# Patient Record
Sex: MALE | Race: BLACK OR AFRICAN AMERICAN | NOT HISPANIC OR LATINO | Employment: FULL TIME | URBAN - METROPOLITAN AREA
[De-identification: names, ages, dates, MRNs, and addresses within clinical notes are randomized per-mention and may not be internally consistent; named-entity substitution may affect disease eponyms.]

---

## 2020-10-02 DIAGNOSIS — Z31.41 ENCOUNTER FOR SPERM COUNT FOR FERTILITY TESTING: Primary | ICD-10-CM

## 2020-10-07 DIAGNOSIS — Z31.41 ENCOUNTER FOR SPERM COUNT FOR FERTILITY TESTING: ICD-10-CM

## 2020-10-07 LAB
ABNORMAL SPERM: 100 MORPHOLOGY
ABSTINENCE DAYS: 3 DAYS (ref 2–7)
AGGLUTINATION: NO YES/NO
ANALYSIS TEMP - CENTIGRADE: 24 CENTIGRADE
CELL FRAGMENTS: ABNORMAL %
COLLECTION METHOD: ABNORMAL
COLLECTION SITE: ABNORMAL
CONSENT TO RELEASE TO PARTNER: YES
HEAD DEFECT: 100
IMMATURE SPERM: ABNORMAL %
IMMOTILE: 65 %
LAB RECEIPT TIME: ABNORMAL
LIQUEFIED: YES YES/NO
MIDPIECE DEFECT: 54
NON-PROGRESSIVE MOTILITY: 15 %
NORMAL SPERM: 0 % NORMAL FORMS (ref 4–?)
PROGRESSIVE MOTILITY: 20 % (ref 32–?)
ROUND CELLS: 0 MILLION/ML (ref ?–2)
SPECIMEN CONCENTRATION: 0.8 MILLION/ML (ref 15–?)
SPECIMEN PH: 7.2 PH (ref 7.2–?)
SPECIMEN TYPE: ABNORMAL
SPECIMEN VOL UR: 1.5 ML (ref 1.5–?)
TAIL DEFECT: 24
TIME OF ANALYSIS: ABNORMAL
TOTAL NUMBER: 1 MILLION (ref 39–?)
TOTAL PROGRESSIVE MOTILE: 0.2 MILLION (ref 15.6–?)
VISCOUS: NO YES/NO
VITALITY: ABNORMAL % (ref 58–?)
WBC SPECIMEN: ABNORMAL %

## 2020-10-07 PROCEDURE — 89322 SEMEN ANAL STRICT CRITERIA: CPT

## 2020-11-17 DIAGNOSIS — Z31.41 ENCOUNTER FOR SPERM COUNT FOR FERTILITY TESTING: Primary | ICD-10-CM

## 2020-11-20 ENCOUNTER — TRANSFERRED RECORDS (OUTPATIENT)
Dept: HEALTH INFORMATION MANAGEMENT | Facility: CLINIC | Age: 35
End: 2020-11-20

## 2020-11-23 DIAGNOSIS — Z31.41 ENCOUNTER FOR SPERM COUNT FOR FERTILITY TESTING: ICD-10-CM

## 2020-11-23 PROCEDURE — 89322 SEMEN ANAL STRICT CRITERIA: CPT

## 2020-11-24 LAB
ABNORMAL SPERM: 99 MORPHOLOGY
ABSTINENCE DAYS: 4 DAYS (ref 2–7)
AGGLUTINATION: NO YES/NO
ANALYSIS TEMP - CENTIGRADE: 23 CENTIGRADE
CELL FRAGMENTS: ABNORMAL %
COLLECTION METHOD: ABNORMAL
COLLECTION SITE: ABNORMAL
CONSENT TO RELEASE TO PARTNER: YES
HEAD DEFECT: 100
IMMATURE SPERM: ABNORMAL %
IMMOTILE: 75 %
LAB RECEIPT TIME: ABNORMAL
LIQUEFIED: YES YES/NO
MIDPIECE DEFECT: 55
NON-PROGRESSIVE MOTILITY: 9 %
NORMAL SPERM: 1 % NORMAL FORMS (ref 4–?)
PROGRESSIVE MOTILITY: 16 % (ref 32–?)
ROUND CELLS: 0 MILLION/ML (ref ?–2)
SPECIMEN CONCENTRATION: 0.8 MILLION/ML (ref 15–?)
SPECIMEN PH: 7.6 PH (ref 7.2–?)
SPECIMEN TYPE: ABNORMAL
SPECIMEN VOL UR: 4.5 ML (ref 1.5–?)
TAIL DEFECT: 21
TIME OF ANALYSIS: ABNORMAL
TOTAL NUMBER: 4 MILLION (ref 39–?)
TOTAL PROGRESSIVE MOTILE: 0.6 MILLION (ref 15.6–?)
VISCOUS: NO YES/NO
VITALITY: ABNORMAL % (ref 58–?)
WBC SPECIMEN: ABNORMAL %

## 2021-05-11 DIAGNOSIS — Z31.41 ENCOUNTER FOR SPERM COUNT FOR FERTILITY TESTING: Primary | ICD-10-CM

## 2021-06-02 DIAGNOSIS — Z31.41 ENCOUNTER FOR SPERM COUNT FOR FERTILITY TESTING: ICD-10-CM

## 2021-06-02 LAB
ABNORMAL SPERM: 98 MORPHOLOGY
ABSTINENCE DAYS: 3 DAYS (ref 2–7)
AGGLUTINATION: NO YES/NO
ANALYSIS TEMP - CENTIGRADE: 24 CENTIGRADE
CELL FRAGMENTS: ABNORMAL %
COLLECTION METHOD: ABNORMAL
COLLECTION SITE: ABNORMAL
CONSENT TO RELEASE TO PARTNER: YES
HEAD DEFECT: 98
IMMATURE SPERM: ABNORMAL %
IMMOTILE: 47 %
LAB RECEIPT TIME: ABNORMAL
LIQUEFIED: YES YES/NO
MIDPIECE DEFECT: 35
NON-PROGRESSIVE MOTILITY: 11 %
NORMAL SPERM: 2 % NORMAL FORMS (ref 4–?)
PROGRESSIVE MOTILITY: 42 % (ref 32–?)
ROUND CELLS: 0 MILLION/ML (ref ?–2)
SPECIMEN CONCENTRATION: 2 MILLION/ML (ref 15–?)
SPECIMEN PH: 7.6 PH (ref 7.2–?)
SPECIMEN TYPE: ABNORMAL
SPECIMEN VOL UR: 3.3 ML (ref 1.5–?)
TAIL DEFECT: 7
TIME OF ANALYSIS: ABNORMAL
TOTAL NUMBER: 7 MILLION (ref 39–?)
TOTAL PROGRESSIVE MOTILE: 3 MILLION (ref 15.6–?)
VISCOUS: NO YES/NO
VITALITY: ABNORMAL % (ref 58–?)
WBC SPECIMEN: ABNORMAL %

## 2021-06-02 PROCEDURE — 89322 SEMEN ANAL STRICT CRITERIA: CPT

## 2021-07-13 ENCOUNTER — MEDICAL CORRESPONDENCE (OUTPATIENT)
Dept: HEALTH INFORMATION MANAGEMENT | Facility: CLINIC | Age: 36
End: 2021-07-13

## 2021-07-14 ENCOUNTER — MEDICAL CORRESPONDENCE (OUTPATIENT)
Dept: HEALTH INFORMATION MANAGEMENT | Facility: CLINIC | Age: 36
End: 2021-07-14

## 2021-07-23 ENCOUNTER — PRE VISIT (OUTPATIENT)
Dept: UROLOGY | Facility: CLINIC | Age: 36
End: 2021-07-23

## 2021-07-23 ENCOUNTER — TELEPHONE (OUTPATIENT)
Dept: UROLOGY | Facility: CLINIC | Age: 36
End: 2021-07-23

## 2021-07-23 NOTE — TELEPHONE ENCOUNTER
[facility] Call Center    Phone Message    May a detailed message be left on voicemail: yes     Reason for Call: Appointment Intake    Referring Provider Name: [facility] for reproductive health   Diagnosis and/or Symptoms: infertility and testicular pain    pts spouse is calling, pt is being referred for infertility and testicular pain, our protocol states to only schedule video visit, and they declined and want in person only with , please call Tiffanie, thank you    Action Taken: Message routed to:  [facility] ([facility]): uro    Travel Screening: Not Applicable

## 2021-07-23 NOTE — TELEPHONE ENCOUNTER
Reason for visit: Consult     Relevant information: infertility and testicular pain    Records/imaging/labs/orders: in Epic; SA completed    Pt called: no    At Rooming: normal

## 2021-07-30 ENCOUNTER — OFFICE VISIT (OUTPATIENT)
Dept: UROLOGY | Facility: CLINIC | Age: 36
End: 2021-07-30
Payer: COMMERCIAL

## 2021-07-30 VITALS
DIASTOLIC BLOOD PRESSURE: 76 MMHG | HEIGHT: 70 IN | HEART RATE: 65 BPM | BODY MASS INDEX: 29.2 KG/M2 | SYSTOLIC BLOOD PRESSURE: 117 MMHG | WEIGHT: 204 LBS

## 2021-07-30 DIAGNOSIS — N50.811 PAIN IN RIGHT TESTICLE: Primary | ICD-10-CM

## 2021-07-30 DIAGNOSIS — I86.1 VARICOCELE: ICD-10-CM

## 2021-07-30 DIAGNOSIS — N43.40 SPERMATOCELE: ICD-10-CM

## 2021-07-30 DIAGNOSIS — N46.11 OLIGOASTHENOTERATOSPERMIA: ICD-10-CM

## 2021-07-30 PROCEDURE — 99204 OFFICE O/P NEW MOD 45 MIN: CPT | Performed by: UROLOGY

## 2021-07-30 RX ORDER — HYDROCODONE BITARTRATE AND ACETAMINOPHEN 5; 325 MG/1; MG/1
1-2 TABLET ORAL
COMMUNITY
Start: 2021-02-16

## 2021-07-30 RX ORDER — GABAPENTIN 300 MG/1
CAPSULE ORAL
COMMUNITY
Start: 2021-06-10

## 2021-07-30 ASSESSMENT — MIFFLIN-ST. JEOR: SCORE: 1856.59

## 2021-07-30 ASSESSMENT — PAIN SCALES - GENERAL: PAINLEVEL: SEVERE PAIN (7)

## 2021-07-30 NOTE — LETTER
7/30/2021       RE: Jordyn Hill  [address] [facility] [address]     Dear Colleague,    Thank you for referring your patient, Jordyn Hill, to the [facility] [place] at [facility]. Please see a copy of my visit note below.    It was my pleasure to see Mr. Jordyn Hill, a 36 year old male here in consultation today for fertility evaluation.  His spouse is Tiffanie age 41 (born March 1983).    This couple has been attempting to conceive for the last year. They have no previous pregnancy together.  Pregnancies with other partners: she has two, he has never fathered a child..  They have tried timed intercourse.     Dr. Camacho did a bilateral varicocele repair for him Feb 2021.  He had lingering right testis pain, post-op scrotal ultrasound was not revealing, it sounds like.  Varicocele repair was done for fertility, and he although he has seen improvement in total progressive motile count, he persists with severe oligospermia.    Complains of pain in right epididymis pain since varicocele repair.  Left side feels fine.  Ice helps with right epididymis discomfort, worse with touch, activity, worse after ejaculation.  Postop imaging suggests persisting left varicocele.    Scrotal ultrasound done 5/25/21 showed left varicocele still  IMPRESSION:   1. Mild left varicocele. Right pampiniform veins with Valsalva measure just below the size threshold for varicocele.   2. A 2 mm cyst inferiorly in the right testicle and 2 small left epididymal head cysts are all similar to prior.    Scrotal ultrasound done 10/2020 showed left varicocele  IMPRESSION:   1. No evidence of testicular mass or torsion.   2. Borderline sized varicoceles bilaterally.       Female factors suspected: advanced age.    PAST MEDICAL HISTORY:    No past medical history on file.   Puberty normal   No associated conditions such as ED or sexual dysfunction.  No   "problems.     PAST SURG HISTORY  Bilateral varicocele repair 2/2021    Medications as of 7/30/2021:  Current Outpatient Medications   Medication Sig     gabapentin (NEURONTIN) 300 MG capsule      HYDROcodone-acetaminophen (NORCO) 5-325 MG tablet Take 1-2 tablets by mouth     vitamin C (ASCORBIC ACID) 250 MG TABS tablet      vitamin E (TOCOPHEROL) 100 units (45 mg) capsule      No current facility-administered medications for this visit.        ALLERGY:   No Known Allergies    SOCIAL HISTORY:   . Occupation: HVAC.     No alcohol or tobacco.      GENERAL PHYSICAL EXAM  /76   Pulse 65   Ht 1.778 m (5' 10\")   Wt 92.5 kg (204 lb)   BMI 29.27 kg/m     Constitutional: No acute distress. Well nourished.   PSYCH: normal mood and affect.  NEURO: normal gait, no focal deficits.   EYES: anicteric, EOMI, PERR  CARDIOPULMONARY: breathing non-labored, pulse regular, no peripheral edema.  GI: Abdomen soft, non-tender, nondistended   MUSCULOSKELETAL: normal limb proportions, no muscle wasting, no contractures.  SKIN: Normal virilized hair distribution, no lesions, warts or rashes over genitalia, abdomen extremities or face.  HEME/LYMPH: no ecchymosis, no lymphadenopathy in groin, no lymphedema.     EXAM:  Phallus circumcised, meatus adequate, no plaques palpated.   Left testis descended , size is 12 cc , consistency is slightly soft . No intra-testicular masses.   Right testis descended , size is 10 cc , consistency is slightly soft . No intra-testicular masses.   Epididymes present, left nontender, non-tender, not enlarged.   His discomfort localizes to the head of the right epididymis.  Left cord: Vas present. No clinical varicocele.  Right cord: Vas present. No clinical varicocele.     Rectal exam deferred.     Labs reviewed with him today:  Component      Latest Ref Rng & Units 10/7/2020 11/23/2020 6/2/2021   Collection Method       Masturbation Masturbation Masturbation   Collection Site       [facility]"   Specimen Type       Semen Semen Semen   Lab Receipt Time       06:35 AM 09:50 AM 06:34 AM   Time of Analysis       06:50 AM 10:05 AM 06:49 AM   Analysis Temp - Centigrade      centigrade 24 23 24   Abstinence days      2 - 7 days 3 4 3   Liquefied      yes/no Yes Yes Yes   Viscous      yes/no No No No   Agglutination      yes/no No No No   pH      7.2 pH 7.2 7.6 7.6   Volume      1.5 ml 1.5 4.5 3.3   Concentration      15 million/ml 0.8 (A) 0.8 (A) 2.0 (A)   Total Number      39 million 1.0 (A) 4.0 (A) 7.0 (A)   Progressive motility      32 % 20 (A) 16 (A) 42   Non-progressive motility      % 15 9 11   Immotile      % 65 75 47   Total Progressive Motile      15.6 million 0.2 (A) 0.6 (A) 3.0 (A)   Vitality      58 % QNS QNS ND   Normal Sperm      4 % normal forms 0 (A) 1 (A) 2 (A)   Abnormal Sperm      morphology 100 99 98   Head Defect       100 100 98   Midpiece Defect       54 55 35   Tail Defect       24 21 7   Round Cells      2 million/ml 0 0 0   WBC      % . . .   Immature Sperm      % . . .   Cell Fragments      % . . .   Consent to Release to Partner       Yes Yes Yes     Labs 10/2/20 outside facility, reviewed today:    E2 41  Prolactin 8.7  LH 9 (1-12)  TSH  1.36  PSA 0.3  FSH 8.4 11/20/20     Karyotype and Y-chromosome microdeletion were normal at outside facility recently. Done after varicocele repair.  They do have a female fertility clinic they are working with.    I reviewed Dr. Camacho's [facility] note 2/16/21 and OP note 2/16/21  ASSESSMENT:    Fertility Testing.    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  Chronic benign issue    Right epididymal pain, etiology uncertain.  Likely chronic issue    Left persisting varicocele by US, but not on clinical exam.      Advanced female age    PLAN:      They are seeing Dr. Valenzuela to pursue IVF.  Karyotype and Y-chromosome microdeletion tested by [facility] clinic, I definitely  agree with checking these tests- would prefer to have done this prior to varicocele repair.    Uncertain etiology for chronic right epididymis discomfort.  On exam, his pain clearly localizes to the right epididymal head.  This area appears normal on imaging.  This discomfort could be due to neuropathic causes, or idiopathic epididymalgia.  There is no clinical varicocele on either side today.    Recommended they pursue in vitro fertilization, especially given female age    Recommended observation for epididymal pain, take over-the-counter analgesics as needed.  Surgical options for pain management would be right epididymectomy, or right orchiectomy.  He could also see a pain clinic to discuss nonsurgical options.    PRN follow-up with urology.     Thank-you for allowing me to care for your patient.  Sincerely,    Fausto Card MD      CC: [facility].    Additional Coding Information:    Problems:  4 -- two or more stable chronic illnesses    Data Reviewed  Review of the result(s) of each unique test - Multiple lab tests as listed above, imaging    I reviewed Dr. Camacho's [facility] clinic note 2/16/21 and OP note 2/16/21      Level of risk:  3 -- low risk (e.g., OTC medication or observation, minor surgery without risks)    Time spent:  43 minutes spent on the date of the encounter doing chart review, history and exam, documentation and further activities per the note

## 2021-07-30 NOTE — NURSING NOTE
"Chief Complaint   Patient presents with     Consult     infertility and testicular pain       Height 1.778 m (5' 10\"), weight 92.5 kg (204 lb). Body mass index is 29.27 kg/m .    There is no problem list on file for this patient.      No Known Allergies    Current Outpatient Medications   Medication Sig Dispense Refill     gabapentin (NEURONTIN) 300 MG capsule        HYDROcodone-acetaminophen (NORCO) 5-325 MG tablet Take 1-2 tablets by mouth       vitamin C (ASCORBIC ACID) 250 MG TABS tablet        vitamin E (TOCOPHEROL) 100 units (45 mg) capsule          Social History     Tobacco Use     Smoking status: Never Smoker     Smokeless tobacco: Never Used   Substance Use Topics     Alcohol use: None     Drug use: None       Hussain Mendosa EMT  7/30/2021  8:51 AM  "

## 2021-07-30 NOTE — PATIENT INSTRUCTIONS
Please follow up with Dr. Card as needed.    It was a pleasure meeting with you today.  Thank you for allowing me and my team the privilege of caring for you today.  YOU are the reason we are here, and I truly hope we provided you with the excellent service you deserve.  Please let us know if there is anything else we can do for you so that we can be sure you are leaving completely satisfied with your care experience.

## 2021-07-30 NOTE — PROGRESS NOTES
It was my pleasure to see Mr. Jordyn Hill, a 36 year old male here in consultation today for fertility evaluation.  His spouse is Tiffanie age 41 (born March 1983).    This couple has been attempting to conceive for the last year. They have no previous pregnancy together.  Pregnancies with other partners: she has two, he has never fathered a child..  They have tried timed intercourse.     Dr. Camacho did a bilateral varicocele repair for him Feb 2021.  He had lingering right testis pain, post-op scrotal ultrasound was not revealing, it sounds like.  Varicocele repair was done for fertility, and he although he has seen improvement in total progressive motile count, he persists with severe oligospermia.    Complains of pain in right epididymis pain since varicocele repair.  Left side feels fine.  Ice helps with right epididymis discomfort, worse with touch, activity, worse after ejaculation.  Postop imaging suggests persisting left varicocele.    Scrotal ultrasound done 5/25/21 showed left varicocele still  IMPRESSION:   1. Mild left varicocele. Right pampiniform veins with Valsalva measure just below the size threshold for varicocele.   2. A 2 mm cyst inferiorly in the right testicle and 2 small left epididymal head cysts are all similar to prior.    Scrotal ultrasound done 10/2020 showed left varicocele  IMPRESSION:   1. No evidence of testicular mass or torsion.   2. Borderline sized varicoceles bilaterally.       Female factors suspected: advanced age.    PAST MEDICAL HISTORY:    No past medical history on file.   Puberty normal   No associated conditions such as ED or sexual dysfunction.  No  problems.     PAST SURG HISTORY  Bilateral varicocele repair 2/2021    Medications as of 7/30/2021:  Current Outpatient Medications   Medication Sig     gabapentin (NEURONTIN) 300 MG capsule      HYDROcodone-acetaminophen (NORCO) 5-325 MG tablet Take 1-2 tablets by mouth     vitamin C (ASCORBIC ACID) 250 MG TABS tablet   "    vitamin E (TOCOPHEROL) 100 units (45 mg) capsule      No current facility-administered medications for this visit.        ALLERGY:   No Known Allergies    SOCIAL HISTORY:   . Occupation: HVAC.     No alcohol or tobacco.      GENERAL PHYSICAL EXAM  /76   Pulse 65   Ht 1.778 m (5' 10\")   Wt 92.5 kg (204 lb)   BMI 29.27 kg/m     Constitutional: No acute distress. Well nourished.   PSYCH: normal mood and affect.  NEURO: normal gait, no focal deficits.   EYES: anicteric, EOMI, PERR  CARDIOPULMONARY: breathing non-labored, pulse regular, no peripheral edema.  GI: Abdomen soft, non-tender, nondistended   MUSCULOSKELETAL: normal limb proportions, no muscle wasting, no contractures.  SKIN: Normal virilized hair distribution, no lesions, warts or rashes over genitalia, abdomen extremities or face.  HEME/LYMPH: no ecchymosis, no lymphadenopathy in groin, no lymphedema.     EXAM:  Phallus circumcised, meatus adequate, no plaques palpated.   Left testis descended , size is 12 cc , consistency is slightly soft . No intra-testicular masses.   Right testis descended , size is 10 cc , consistency is slightly soft . No intra-testicular masses.   Epididymes present, left nontender, non-tender, not enlarged.   His discomfort localizes to the head of the right epididymis.  Left cord: Vas present. No clinical varicocele.  Right cord: Vas present. No clinical varicocele.     Rectal exam deferred.     Labs reviewed with him today:  Component      Latest Ref Rng & Units 10/7/2020 11/23/2020 6/2/2021   Collection Method       Masturbation Masturbation Masturbation   Collection Site       [facility]   Specimen Type       Semen Semen Semen   Lab Receipt Time       06:35 AM 09:50 AM 06:34 AM   Time of Analysis       06:50 AM 10:05 AM 06:49 AM   Analysis Temp - Centigrade      centigrade 24 23 24   Abstinence days      2 - 7 days 3 4 3   Liquefied      yes/no Yes Yes Yes   Viscous      yes/no No No No   Agglutination      " yes/no No No No   pH      7.2 pH 7.2 7.6 7.6   Volume      1.5 ml 1.5 4.5 3.3   Concentration      15 million/ml 0.8 (A) 0.8 (A) 2.0 (A)   Total Number      39 million 1.0 (A) 4.0 (A) 7.0 (A)   Progressive motility      32 % 20 (A) 16 (A) 42   Non-progressive motility      % 15 9 11   Immotile      % 65 75 47   Total Progressive Motile      15.6 million 0.2 (A) 0.6 (A) 3.0 (A)   Vitality      58 % QNS QNS ND   Normal Sperm      4 % normal forms 0 (A) 1 (A) 2 (A)   Abnormal Sperm      morphology 100 99 98   Head Defect       100 100 98   Midpiece Defect       54 55 35   Tail Defect       24 21 7   Round Cells      2 million/ml 0 0 0   WBC      % . . .   Immature Sperm      % . . .   Cell Fragments      % . . .   Consent to Release to Partner       Yes Yes Yes     Labs 10/2/20 outside facility, reviewed today:    E2 41  Prolactin 8.7  LH 9 (1-12)  TSH  1.36  PSA 0.3  FSH 8.4 11/20/20     Karyotype and Y-chromosome microdeletion were normal at outside facility recently. Done after varicocele repair.  They do have a female fertility clinic they are working with.    I reviewed Dr. Camacho[facility] note 2/16/21 and OP note 2/16/21  ASSESSMENT:    Fertility Testing.    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  Chronic benign issue    Right epididymal pain, etiology uncertain.  Likely chronic issue    Left persisting varicocele by US, but not on clinical exam.      Advanced female age    PLAN:      They are seeing Dr. Valenzuela to pursue IVF.  Karyotype and Y-chromosome microdeletion tested by [facility] clinic, I definitely agree with checking these tests- would prefer to have done this prior to varicocele repair.    Uncertain etiology for chronic right epididymis discomfort.  On exam, his pain clearly localizes to the right epididymal head.  This area appears normal on imaging.  This discomfort could be due to neuropathic causes, or idiopathic  epididymalgia.  There is no clinical varicocele on either side today.    Recommended they pursue in vitro fertilization, especially given female age    Recommended observation for epididymal pain, take over-the-counter analgesics as needed.  Surgical options for pain management would be right epididymectomy, or right orchiectomy.  He could also see a pain clinic to discuss nonsurgical options.    PRN follow-up with urology.     Thank-you for allowing me to care for your patient.  Sincerely,    Fausto Card MD      CC: [facility].    Additional Coding Information:    Problems:  4 -- two or more stable chronic illnesses    Data Reviewed  Review of the result(s) of each unique test - Multiple lab tests as listed above, imaging    I reviewed Dr. Camacho's [facility] note 2/16/21 and OP note 2/16/21      Level of risk:  3 -- low risk (e.g., OTC medication or observation, minor surgery without risks)    Time spent:  43 minutes spent on the date of the encounter doing chart review, history and exam, documentation and further activities per the note

## 2024-08-14 ENCOUNTER — HOSPITAL ENCOUNTER (EMERGENCY)
Facility: CLINIC | Age: 39
Discharge: HOME | End: 2024-08-14

## 2024-08-14 VITALS
DIASTOLIC BLOOD PRESSURE: 71 MMHG | SYSTOLIC BLOOD PRESSURE: 121 MMHG | TEMPERATURE: 99 F | RESPIRATION RATE: 16 BRPM | WEIGHT: 220 LBS | HEIGHT: 70 IN | BODY MASS INDEX: 31.5 KG/M2 | HEART RATE: 65 BPM | OXYGEN SATURATION: 100 %

## 2024-08-14 DIAGNOSIS — S61.411A LACERATION OF RIGHT HAND WITHOUT FOREIGN BODY, INITIAL ENCOUNTER: ICD-10-CM

## 2024-08-14 PROCEDURE — 99282 EMERGENCY DEPT VISIT SF MDM: CPT

## 2024-08-14 PROCEDURE — 12001 RPR S/N/AX/GEN/TRNK 2.5CM/<: CPT | Mod: RT

## 2024-08-14 RX ORDER — BUPIVACAINE HYDROCHLORIDE 5 MG/ML
INJECTION, SOLUTION EPIDURAL; INTRACAUDAL
Status: DISCONTINUED
Start: 2024-08-14 | End: 2024-08-14 | Stop reason: HOSPADM

## 2024-08-14 RX ORDER — BUPIVACAINE HYDROCHLORIDE 5 MG/ML
5 INJECTION, SOLUTION EPIDURAL; INTRACAUDAL ONCE
Status: DISCONTINUED | OUTPATIENT
Start: 2024-08-14 | End: 2024-08-14 | Stop reason: HOSPADM

## 2024-08-14 ASSESSMENT — COLUMBIA-SUICIDE SEVERITY RATING SCALE - C-SSRS
6. HAVE YOU EVER DONE ANYTHING, STARTED TO DO ANYTHING, OR PREPARED TO DO ANYTHING TO END YOUR LIFE?: NO
2. HAVE YOU ACTUALLY HAD ANY THOUGHTS OF KILLING YOURSELF IN THE PAST MONTH?: NO
1. IN THE PAST MONTH, HAVE YOU WISHED YOU WERE DEAD OR WISHED YOU COULD GO TO SLEEP AND NOT WAKE UP?: NO

## 2024-08-14 ASSESSMENT — ACTIVITIES OF DAILY LIVING (ADL)
ADLS_ACUITY_SCORE: 33
ADLS_ACUITY_SCORE: 35

## 2024-08-14 NOTE — Clinical Note
Jordyn Hill was seen and treated in our emergency department on 8/14/2024.  He may return to work on 08/16/2024.  Patient must wear wrist brace while working to prevent wound disruption until sutures are removed in one week, should not perform any functions that cause increased pain of the hand until sutures are removed     If you have any questions or concerns, please don't hesitate to call.      Nathalia Coombs PA-C

## 2024-08-14 NOTE — ED PROVIDER NOTES
Laceration Repair      Procedure: Laceration Repair    Indication: Laceration    Consent: Verbal    Tetanus status reviewed: 6/17/2021    Location: Right Hand (dorsal)    Length: 2.5 cm    Preparation: Irrigation with Sterile Saline.    Anesthesia/Sedation: Bupivacaine - 0.5%      Treatment/Exploration: Wound explored, no foreign bodies found     Closure: The wound was closed with one layer. Skin/superficial layer was closed with 5 x 5-0 Nylon using Interrupted sutures.     Patient Status: The patient tolerated the procedure well: Yes. There were no complications.       Shankar, JERRY Johnson  08/14/24 2373

## 2024-08-14 NOTE — ED PROVIDER NOTES
"  Emergency Department Note      History of Present Illness     Chief Complaint   Laceration    HPI   Jordyn Hill is a right-handed 39 year old male who presents with a laceration over the dorsum of his hand.  The patient states that shortly prior to arrival, approximately 1 hour, he was reaching behind the machine and when he pulled his hand out cut the dorsum of his right hand.  He denies any numbness or weakness.  Tetanus was last updated in 2021.       Independent Historian   None    Review of External Notes   I reviewed the patient's [facility]IC. His tetanus is up to date.     Past Medical History     Medical History and Problem List   Chronic neck pain   Chronic right shoulder pain   Skin sensitivity  Oligospermia  Bilateral varicoceles     Medications   The patient is not currently taking any prescribed medications.    Surgical History   Bilateral varicocelectomy     Physical Exam     Patient Vitals for the past 24 hrs:   BP Temp Temp src Pulse Resp SpO2 Height Weight   08/14/24 1533 (!) 143/85 99  F (37.2  C) Temporal 77 20 100 % 1.778 m (5' 10\") 99.8 kg (220 lb)     Physical Exam  General: Resting on the gurney  Head:  The scalp, face, and head appear normal  Eyes:  The pupils are normal    Conjunctivae and sclera appear normal  ENT:    The nose is normal    Ears/pinnae are normal  MS:  Right Hand Exam:    There is a 2.5 cm laceration over the radial dorsal surface of the hand overlying the 1st and 2nd web space. Minimal active bleeding.    The finger flexors (FDS/FDP) are intact    The finger extensors are intact    The thumb exam is normal, including:    Adduction, abduction, flexion, extension, opposition    There are no sensory deficits    Median, Ulnar, and Radial nerve function is normal    Radial artery pulsations are normal    Capillary refill is normal  Skin:  No rash or lesions noted, swelling is notes as above  Neuro:  Speech is normal and fluent  Psych: Awake. Alert.  Normal affect.  Appropriate " interactions.    Diagnostics     Independent Interpretation   None    ED Course      Medications Administered   Medications   BUPivacaine (PF) (MARCAINE) 0.5 % injection (has no administration in time range)   BUPivacaine (MARCAINE) 0.5% preservative free injection (has no administration in time range)     Procedures   Procedures   Refer to Alex Chaparro PA-C's note for details on laceration repair.     Discussion of Management   None    ED Course   ED Course as of 08/14/24 1713   Wed Aug 14, 2024   1606 I obtained the patient's history and examined as noted above.    1635 I rechecked the patient.      Additional Documentation  None    Medical Decision Making / Diagnosis     CMS Diagnoses: None    MIPS       None    [facility]   Jordyn Hill is a 39 year old male presents for evaluation of a laceration to right hand. The wound was carefully evaluated and explored. There was no foreign body identified. CMS is intact.  There is no evidence of muscular, tendon, bone, or nerve damage with this laceration. The laceration was closed as noted in the procedure note. The patient tolerated the procedure well.  Possible complications (infection, scarring) were reviewed with the patient. Appropriate wound dressing was placed and daily cares were discussed. Tetanus is up to date. They will be discharged home with primary care follow up in 7 days for suture removal. They will return immediately for fevers, purulent drainage, spreading redness, increased pain or any other concerning symptoms. Patient agrees with the plan and all questions/concerns addressed prior to discharge home.     Disposition   The patient was discharged.     Diagnosis     ICD-10-CM    1. Laceration of right hand without foreign body, initial encounter  S61.411A          Scribe Disclosure:  I, Kirstie Petty, am serving as a scribe at 4:11 PM on 8/14/2024 to document services personally performed by Nathalia Coombs PA based on my observations and the  provider's statements to me.      Nathalia Coombs PA Berthiaume, Carley J, PA-C  08/15/24 0058

## 2024-08-14 NOTE — DISCHARGE INSTRUCTIONS
Wear brace with activities to prevent sutures from breaking  Follow up in 7 days for suture removal at [facility] medicine office, urgent care, or ER    Discharge Instructions  Laceration (Cut)    You were seen today for a laceration (cut).  Your provider examined your laceration for any problems such a buried foreign body (like glass, a splinter, or gravel), or injury to blood vessels, tendons, and nerves.  Your provider may have also rinsed and/or scrubbed your laceration to help prevent an infection. It may not be possible to find all problems with your laceration on the first visit; occasionally foreign bodies or a tendon injury can go undetected.    Your laceration may have been closed in one of several ways:  No closure: many wounds will heal just fine without closure.  Stitches: regular stitches that require removal.  Staples: skin staples are often used in the scalp/head.  Wound adhesive (glue): skin glue can be used for certain lacerations and doesn t require removal.  Wound strips (aka Butterfly bandages or steri-strips): these are bandages that help to close a wound.  Absorbable stitches:  dissolving  stitches that go away on their own and usually don t require removal.    A small percentage of wounds will develop an infection regardless of how well the wound is cared for. Antibiotics are generally not indicated to prevent an infection so are only given for a small number of high-risk wounds. Some lacerations are too high risk to close, and are left open to heal because closure can increase the likelihood that an infection will develop.    Remember that all lacerations, no matter how expertly repaired, will cause scarring. We consider many factors, techniques, and materials, in our efforts to provide the best possible cosmetic outcome.    Generally, every Emergency Department visit should have a follow-up clinic visit with either a primary or a specialty clinic/provider. Please follow-up as instructed by your  emergency provider today.     Return to the Emergency Department right away if:  You have more redness, swelling, pain, drainage (pus), a bad smell, or red streaking from your laceration as these symptoms could indicate an infection.  You have a fever of 100.4 F or more.  You have bleeding that you cannot stop at home. If your cut starts to bleed, hold pressure on the bleeding area with a clean cloth or put pressure over the bandage.  If the bleeding does not stop after using constant pressure for 30 minutes, you should return to the Emergency Department for further treatment.  An area past the laceration is cool, pale, or blue compared with the other side, or has a slower return of color when squeezed.  Your dressing seems too tight or starts to get uncomfortable or painful. For children, signs of a problem might be irritability or restlessness.  You have loss of normal function or use of an area, such as being unable to straighten or bend a finger normally.  You have a numb area past the laceration.    Return to the Emergency Department or see your regular provider if:  The laceration starts to come open.   You have something coming out of the cut or a feeling that there is something in the laceration.  Your wound will not heal, or keeps breaking open. There can always be glass, wood, dirt or other things in any wound.  They will not always show up, even on x-rays.  If a wound does not heal, this may be why, and it is important to follow-up with your regular provider.    Home Care:  Take your dressing off in 12-24 hours, or as instructed by your provider, to check your laceration. Remove the dressing sooner if it seems too tight or painful, or if it is getting numb, tingly, or pale past the dressing.  Gently wash your laceration 1-2 times daily with clean water and mild soap. It is okay to shower or run clean water over the laceration, but do not let the laceration soak in water (no swimming).  If your laceration  was closed with wound adhesive or strips: pat it dry and leave it open to the air. For all other repairs: after you wash your laceration, or at least 2 times a day, apply antibiotic ointment (such as Neosporin  or Bacitracin ) to the laceration, then cover it with a Band-Aid  or gauze.  Keep the laceration clean. Wear gloves or other protective clothing if you are around dirt.    Follow-up for removal:  If your wound was closed with staples or regular stitches, they need to be removed according to the instructions and timeline specified by your provider today.  If your wound was closed with absorbable ( dissolving ) sutures, they should fall out, dissolve, or not be visible in about one week. If they are still visible, then they should be removed according to the instructions and timeline specified by your provider today.    Scars:  To help minimize scarring:  Wear sunscreen over the healed laceration when out in the sun.  Massage the area regularly once healed.  You may apply Vitamin E to the healed wound.  Wait. Scars improve in appearance over months and years.    If you were given a prescription for medicine here today, be sure to read all of the information (including the package insert) that comes with your prescription.  This will include important information about the medicine, its side effects, and any warnings that you need to know about.  The pharmacist who fills the prescription can provide more information and answer questions you may have about the medicine.  If you have questions or concerns that the pharmacist cannot address, please call or return to the Emergency Department.       Remember that you can always come back to the Emergency Department if you are not able to see your regular provider in the amount of time listed above, if you get any new symptoms, or if there is anything that worries you.

## 2024-08-14 NOTE — ED TRIAGE NOTES
Pt here for hand laceration. Pt was reaching in a furnace when his hand was caught on a screw. 1 inch lac noted on R hand. Wound cleaned and dressed in triage. Bleeding controled. Unsure of last tdap     Triage Assessment (Adult)       Row Name 08/14/24 1532          Triage Assessment    Airway WDL WDL        Respiratory WDL    Respiratory WDL WDL        Skin Circulation/Temperature WDL    Skin Circulation/Temperature WDL WDL        Cardiac WDL    Cardiac WDL WDL        Peripheral/Neurovascular WDL    Peripheral Neurovascular WDL WDL        Cognitive/Neuro/Behavioral WDL    Cognitive/Neuro/Behavioral WDL WDL